# Patient Record
Sex: MALE | Race: BLACK OR AFRICAN AMERICAN | Employment: UNEMPLOYED | URBAN - METROPOLITAN AREA
[De-identification: names, ages, dates, MRNs, and addresses within clinical notes are randomized per-mention and may not be internally consistent; named-entity substitution may affect disease eponyms.]

---

## 2024-09-09 ENCOUNTER — HOSPITAL ENCOUNTER (EMERGENCY)
Facility: HOSPITAL | Age: 39
Discharge: HOME | End: 2024-09-09
Payer: MEDICAID

## 2024-09-09 VITALS
RESPIRATION RATE: 16 BRPM | HEART RATE: 83 BPM | WEIGHT: 265 LBS | OXYGEN SATURATION: 100 % | DIASTOLIC BLOOD PRESSURE: 93 MMHG | HEIGHT: 75 IN | SYSTOLIC BLOOD PRESSURE: 143 MMHG | TEMPERATURE: 98.4 F | BODY MASS INDEX: 32.95 KG/M2

## 2024-09-09 DIAGNOSIS — T14.8XXD ENCOUNTER FOR RE-CHECK OF LACERATION WOUND: Primary | ICD-10-CM

## 2024-09-09 PROCEDURE — 99282 EMERGENCY DEPT VISIT SF MDM: CPT

## 2024-09-09 ASSESSMENT — PAIN - FUNCTIONAL ASSESSMENT: PAIN_FUNCTIONAL_ASSESSMENT: 0-10

## 2024-09-09 ASSESSMENT — PAIN SCALES - GENERAL: PAINLEVEL_OUTOF10: 0

## 2024-09-13 ENCOUNTER — HOSPITAL ENCOUNTER (EMERGENCY)
Facility: HOSPITAL | Age: 39
Discharge: LEFT BEFORE DISCHARGE | End: 2024-09-13

## 2024-09-13 VITALS
DIASTOLIC BLOOD PRESSURE: 80 MMHG | TEMPERATURE: 97.6 F | OXYGEN SATURATION: 97 % | HEIGHT: 75 IN | SYSTOLIC BLOOD PRESSURE: 152 MMHG | BODY MASS INDEX: 32.95 KG/M2 | WEIGHT: 265 LBS | RESPIRATION RATE: 20 BRPM | HEART RATE: 87 BPM

## 2024-09-13 ASSESSMENT — PAIN - FUNCTIONAL ASSESSMENT: PAIN_FUNCTIONAL_ASSESSMENT: NONE - DENIES PAIN

## 2024-09-16 ENCOUNTER — HOSPITAL ENCOUNTER (EMERGENCY)
Facility: HOSPITAL | Age: 39
Discharge: HOME | End: 2024-09-16
Payer: MEDICAID

## 2024-09-16 VITALS
RESPIRATION RATE: 19 BRPM | BODY MASS INDEX: 33.07 KG/M2 | SYSTOLIC BLOOD PRESSURE: 165 MMHG | WEIGHT: 266 LBS | OXYGEN SATURATION: 98 % | HEART RATE: 81 BPM | HEIGHT: 75 IN | TEMPERATURE: 98.6 F | DIASTOLIC BLOOD PRESSURE: 84 MMHG

## 2024-09-16 DIAGNOSIS — S71.111D LACERATION OF RIGHT THIGH, SUBSEQUENT ENCOUNTER: ICD-10-CM

## 2024-09-16 DIAGNOSIS — Z48.02 VISIT FOR SUTURE REMOVAL: Primary | ICD-10-CM

## 2024-09-16 PROCEDURE — 4500000002 HC ER NO CHARGE

## 2024-09-16 PROCEDURE — 6370000000 HC RX 637 (ALT 250 FOR IP): Performed by: PHYSICIAN ASSISTANT

## 2024-09-16 RX ORDER — NEOMYCIN/BACITRACIN/POLYMYXINB 3.5-400-5K
OINTMENT (GRAM) TOPICAL
Qty: 14 G | Refills: 0 | Status: SHIPPED | OUTPATIENT
Start: 2024-09-16

## 2024-09-16 RX ORDER — NEOMYCIN/BACITRACIN/POLYMYXINB 3.5-400-5K
OINTMENT (GRAM) TOPICAL
Status: COMPLETED | OUTPATIENT
Start: 2024-09-16 | End: 2024-09-16

## 2024-09-16 RX ADMIN — BACITRACIN ZINC, NEOMYCIN, POLYMYXIN B SULFAT: 5000; 3.5; 4 OINTMENT TOPICAL at 19:19

## 2024-09-16 ASSESSMENT — ENCOUNTER SYMPTOMS
VOMITING: 0
COUGH: 0
PHOTOPHOBIA: 0
WHEEZING: 0
SORE THROAT: 0
BACK PAIN: 0
EYE PAIN: 0
RHINORRHEA: 0
SHORTNESS OF BREATH: 0
NAUSEA: 0
ABDOMINAL PAIN: 0
CHEST TIGHTNESS: 0

## 2024-09-16 ASSESSMENT — PAIN - FUNCTIONAL ASSESSMENT: PAIN_FUNCTIONAL_ASSESSMENT: NONE - DENIES PAIN

## 2025-06-17 ENCOUNTER — HOSPITAL ENCOUNTER (EMERGENCY)
Facility: HOSPITAL | Age: 40
Discharge: HOME | End: 2025-06-17
Payer: OTHER MISCELLANEOUS

## 2025-06-17 VITALS
BODY MASS INDEX: 32.65 KG/M2 | HEART RATE: 84 BPM | WEIGHT: 262.6 LBS | OXYGEN SATURATION: 99 % | SYSTOLIC BLOOD PRESSURE: 144 MMHG | TEMPERATURE: 98.6 F | HEIGHT: 75 IN | DIASTOLIC BLOOD PRESSURE: 87 MMHG | RESPIRATION RATE: 18 BRPM

## 2025-06-17 DIAGNOSIS — T14.8XXA MUSCLE STRAIN: ICD-10-CM
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
## 2025-06-17 DIAGNOSIS — R51.9 ACUTE NONINTRACTABLE HEADACHE, UNSPECIFIED HEADACHE TYPE: ICD-10-CM

## 2025-06-17 DIAGNOSIS — L03.811 CELLULITIS OF HEAD EXCEPT FACE: ICD-10-CM

## 2025-06-17 PROCEDURE — 99283 EMERGENCY DEPT VISIT LOW MDM: CPT

## 2025-06-17 RX ORDER — LIDOCAINE 50 MG/G
1 PATCH TOPICAL DAILY
Qty: 10 PATCH | Refills: 0 | Status: SHIPPED | OUTPATIENT
Start: 2025-06-17 | End: 2025-06-27

## 2025-06-17 RX ORDER — ACETAMINOPHEN 500 MG
500 TABLET ORAL 4 TIMES DAILY PRN
Qty: 40 TABLET | Refills: 0 | Status: SHIPPED | OUTPATIENT
Start: 2025-06-17 | End: 2025-06-27

## 2025-06-17 RX ORDER — CEPHALEXIN 500 MG/1
500 CAPSULE ORAL 2 TIMES DAILY
Qty: 14 CAPSULE | Refills: 0 | Status: SHIPPED | OUTPATIENT
Start: 2025-06-17 | End: 2025-06-24

## 2025-06-17 RX ORDER — METHOCARBAMOL 750 MG/1
750 TABLET, FILM COATED ORAL 4 TIMES DAILY
Qty: 24 TABLET | Refills: 0 | Status: SHIPPED | OUTPATIENT
Start: 2025-06-17 | End: 2025-06-23

## 2025-06-17 RX ORDER — IBUPROFEN 800 MG/1
800 TABLET, FILM COATED ORAL 2 TIMES DAILY PRN
Qty: 30 TABLET | Refills: 0 | Status: SHIPPED | OUTPATIENT
Start: 2025-06-17 | End: 2025-07-02

## 2025-06-17 ASSESSMENT — LIFESTYLE VARIABLES
HOW MANY STANDARD DRINKS CONTAINING ALCOHOL DO YOU HAVE ON A TYPICAL DAY: PATIENT DOES NOT DRINK
HOW OFTEN DO YOU HAVE A DRINK CONTAINING ALCOHOL: NEVER

## 2025-06-17 ASSESSMENT — PAIN SCALES - GENERAL: PAINLEVEL_OUTOF10: 3

## 2025-06-17 NOTE — DISCHARGE INSTRUCTIONS
.PRIMARY CARE in [facility]:  [address] [place] [address].  [phone]  Ban Green MD Family Medicine  Solomon Ramirez MD Family Medicine  Jus Patel MD Family Medicine    [facility]: [address] [place] [address]. [phone]   Mathieu Rico MD Family Medicine  Reji Solorio, DEVONTE [facility]  Fabienne Wiggins, DEV[facility]TE Family [facility]    [facility]: [address], [address], [place] [address]. [phone]  Monika Erwin MD Family Medicine  Yuki Childs MD Family Medicine  Hossein Elizalde, [facility]TE [facility]  Amber Herrera, [facility]TE Family [facility]    [facility]: [address], [address] [address]. [phone]  Amina Beach MD Internal Medicine  Tj Adrian MD Internal Medicine  Michelle Ibanez MD Internal Medicine  Georgia Ayala, PA Family Medicine    [facility]: [address], [place] [address]. [phone]  Graciela Elizabeth MD Family Medicine  Shanda Carreon MD Family Medicine  Naveen Tee, DO Infectious Disease  Sunil Naidu MD [facility]    [facility]: [address], [address], [place] [address]. [phone]  Piper Titus,  Family Medicine  Amaris Elizalde NP Internal Medicine  Bharti Vivar, [facility]TE Internal [facility]    [facility]: [address], [place] [address]. [phone]  Cookie Tello MD Internal Medicine  Yudith Morales MD Internal Medicine    [facility]: [address] [place] [address]. [phone]  Kay Resendiz MD Family Medicine  Lesa Rivera MD Family Medicine  Dmitri Doll MD Family Medicine  Charley Valera, [facility]  Cj Elizalde, ARPN, CNP Family Medicine    [facility]
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
